# Patient Record
Sex: MALE | Race: OTHER | Employment: OTHER | URBAN - METROPOLITAN AREA
[De-identification: names, ages, dates, MRNs, and addresses within clinical notes are randomized per-mention and may not be internally consistent; named-entity substitution may affect disease eponyms.]

---

## 2018-03-02 ENCOUNTER — HOSPITAL ENCOUNTER (OUTPATIENT)
Age: 58
Discharge: HOME | End: 2018-03-02
Payer: MEDICARE

## 2018-03-02 VITALS
SYSTOLIC BLOOD PRESSURE: 144 MMHG | OXYGEN SATURATION: 96 % | WEIGHT: 150 LBS | TEMPERATURE: 99 F | HEART RATE: 76 BPM | DIASTOLIC BLOOD PRESSURE: 86 MMHG | RESPIRATION RATE: 20 BRPM

## 2018-03-02 DIAGNOSIS — B34.9 VIRAL SYNDROME: Primary | ICD-10-CM

## 2018-03-02 LAB — S PYO AG THROAT QL: NEGATIVE

## 2018-03-02 PROCEDURE — 87430 STREP A AG IA: CPT

## 2018-03-02 PROCEDURE — 99212 OFFICE O/P EST SF 10 MIN: CPT

## 2018-03-02 RX ORDER — OMEPRAZOLE 20 MG/1
20 CAPSULE, DELAYED RELEASE ORAL
COMMUNITY

## 2018-03-02 RX ORDER — CLONAZEPAM 2 MG/1
2 TABLET ORAL NIGHTLY PRN
COMMUNITY

## 2018-03-02 RX ORDER — LOSARTAN POTASSIUM 50 MG/1
TABLET ORAL DAILY
COMMUNITY

## 2018-03-02 RX ORDER — CELECOXIB 100 MG/1
100 CAPSULE ORAL 2 TIMES DAILY PRN
COMMUNITY

## 2018-03-02 RX ORDER — PREDNISONE 5 MG/1
TABLET ORAL
COMMUNITY

## 2018-03-02 RX ORDER — PREDNISONE 1 MG/1
5 TABLET ORAL DAILY
COMMUNITY

## 2018-03-02 NOTE — ED PROVIDER NOTES
Patient presents with:  Sore Throat  Fever (infectious)      HPI:     Evaline Pawan is a 65year old male with a past history of lupus presents with sore throat, subjective fevers, and left ear pain since yesterday. Patient denies any cough.   No neck pa reviewed and is negative. Instructed patient on supportive care as well as close follow-up with primary care provider. Patient verbalized plan of care and stated understanding.              Orders Placed This Encounter      POCT Rapid Strep    Labs perfor